# Patient Record
Sex: MALE | Race: WHITE | NOT HISPANIC OR LATINO | Employment: OTHER | URBAN - METROPOLITAN AREA
[De-identification: names, ages, dates, MRNs, and addresses within clinical notes are randomized per-mention and may not be internally consistent; named-entity substitution may affect disease eponyms.]

---

## 2020-10-06 ENCOUNTER — HOSPITAL ENCOUNTER (INPATIENT)
Facility: CLINIC | Age: 80
LOS: 3 days | Discharge: HOME | DRG: 389 | End: 2020-10-09
Attending: STUDENT IN AN ORGANIZED HEALTH CARE EDUCATION/TRAINING PROGRAM | Admitting: INTERNAL MEDICINE
Payer: COMMERCIAL

## 2020-10-06 ENCOUNTER — APPOINTMENT (OUTPATIENT)
Dept: GENERAL RADIOLOGY | Facility: CLINIC | Age: 80
DRG: 389 | End: 2020-10-06
Attending: STUDENT IN AN ORGANIZED HEALTH CARE EDUCATION/TRAINING PROGRAM
Payer: COMMERCIAL

## 2020-10-06 PROBLEM — K56.609 SMALL BOWEL OBSTRUCTION (H): Status: ACTIVE | Noted: 2020-10-06

## 2020-10-06 LAB
ALBUMIN SERPL-MCNC: 2.3 G/DL (ref 3.4–5)
ALP SERPL-CCNC: 69 U/L (ref 40–150)
ALT SERPL W P-5'-P-CCNC: 26 U/L (ref 0–70)
ANION GAP SERPL CALCULATED.3IONS-SCNC: 5 MMOL/L (ref 3–14)
AST SERPL W P-5'-P-CCNC: 11 U/L (ref 0–45)
BASOPHILS # BLD AUTO: 0 10E9/L (ref 0–0.2)
BASOPHILS NFR BLD AUTO: 0.5 %
BILIRUB SERPL-MCNC: 0.4 MG/DL (ref 0.2–1.3)
BUN SERPL-MCNC: 28 MG/DL (ref 7–30)
CALCIUM SERPL-MCNC: 8.9 MG/DL (ref 8.5–10.1)
CHLORIDE SERPL-SCNC: 112 MMOL/L (ref 94–109)
CO2 SERPL-SCNC: 22 MMOL/L (ref 20–32)
CREAT SERPL-MCNC: 1.26 MG/DL (ref 0.66–1.25)
CRP SERPL-MCNC: 110 MG/L (ref 0–8)
DIFFERENTIAL METHOD BLD: ABNORMAL
EOSINOPHIL # BLD AUTO: 0.4 10E9/L (ref 0–0.7)
EOSINOPHIL NFR BLD AUTO: 4.4 %
ERYTHROCYTE [DISTWIDTH] IN BLOOD BY AUTOMATED COUNT: 13 % (ref 10–15)
ERYTHROCYTE [SEDIMENTATION RATE] IN BLOOD BY WESTERGREN METHOD: 55 MM/H (ref 0–20)
GFR SERPL CREATININE-BSD FRML MDRD: 54 ML/MIN/{1.73_M2}
GLUCOSE SERPL-MCNC: 111 MG/DL (ref 70–99)
HCT VFR BLD AUTO: 35.1 % (ref 40–53)
HGB BLD-MCNC: 11.4 G/DL (ref 13.3–17.7)
IMM GRANULOCYTES # BLD: 0 10E9/L (ref 0–0.4)
IMM GRANULOCYTES NFR BLD: 0.4 %
LYMPHOCYTES # BLD AUTO: 1.3 10E9/L (ref 0.8–5.3)
LYMPHOCYTES NFR BLD AUTO: 15.6 %
MCH RBC QN AUTO: 30.2 PG (ref 26.5–33)
MCHC RBC AUTO-ENTMCNC: 32.5 G/DL (ref 31.5–36.5)
MCV RBC AUTO: 93 FL (ref 78–100)
MONOCYTES # BLD AUTO: 1.3 10E9/L (ref 0–1.3)
MONOCYTES NFR BLD AUTO: 15.6 %
NEUTROPHILS # BLD AUTO: 5.3 10E9/L (ref 1.6–8.3)
NEUTROPHILS NFR BLD AUTO: 63.5 %
NRBC # BLD AUTO: 0 10*3/UL
NRBC BLD AUTO-RTO: 0 /100
PLATELET # BLD AUTO: 200 10E9/L (ref 150–450)
POTASSIUM SERPL-SCNC: 3.6 MMOL/L (ref 3.4–5.3)
PROT SERPL-MCNC: 7 G/DL (ref 6.8–8.8)
RBC # BLD AUTO: 3.78 10E12/L (ref 4.4–5.9)
SODIUM SERPL-SCNC: 139 MMOL/L (ref 133–144)
WBC # BLD AUTO: 8.4 10E9/L (ref 4–11)

## 2020-10-06 PROCEDURE — 120N000002 HC R&B MED SURG/OB UMMC

## 2020-10-06 PROCEDURE — 87506 IADNA-DNA/RNA PROBE TQ 6-11: CPT | Performed by: STUDENT IN AN ORGANIZED HEALTH CARE EDUCATION/TRAINING PROGRAM

## 2020-10-06 PROCEDURE — 80053 COMPREHEN METABOLIC PANEL: CPT | Performed by: STUDENT IN AN ORGANIZED HEALTH CARE EDUCATION/TRAINING PROGRAM

## 2020-10-06 PROCEDURE — 87493 C DIFF AMPLIFIED PROBE: CPT | Performed by: STUDENT IN AN ORGANIZED HEALTH CARE EDUCATION/TRAINING PROGRAM

## 2020-10-06 PROCEDURE — 36415 COLL VENOUS BLD VENIPUNCTURE: CPT | Performed by: STUDENT IN AN ORGANIZED HEALTH CARE EDUCATION/TRAINING PROGRAM

## 2020-10-06 PROCEDURE — 85652 RBC SED RATE AUTOMATED: CPT | Performed by: STUDENT IN AN ORGANIZED HEALTH CARE EDUCATION/TRAINING PROGRAM

## 2020-10-06 PROCEDURE — 99222 1ST HOSP IP/OBS MODERATE 55: CPT | Mod: AI | Performed by: INTERNAL MEDICINE

## 2020-10-06 PROCEDURE — 83690 ASSAY OF LIPASE: CPT | Performed by: STUDENT IN AN ORGANIZED HEALTH CARE EDUCATION/TRAINING PROGRAM

## 2020-10-06 PROCEDURE — 999N000065 XR ABDOMEN PORT 1 VW

## 2020-10-06 PROCEDURE — 86140 C-REACTIVE PROTEIN: CPT | Performed by: STUDENT IN AN ORGANIZED HEALTH CARE EDUCATION/TRAINING PROGRAM

## 2020-10-06 PROCEDURE — 258N000003 HC RX IP 258 OP 636: Performed by: STUDENT IN AN ORGANIZED HEALTH CARE EDUCATION/TRAINING PROGRAM

## 2020-10-06 PROCEDURE — 999N000127 HC STATISTIC PERIPHERAL IV START W US GUIDANCE

## 2020-10-06 PROCEDURE — 85025 COMPLETE CBC W/AUTO DIFF WBC: CPT | Performed by: STUDENT IN AN ORGANIZED HEALTH CARE EDUCATION/TRAINING PROGRAM

## 2020-10-06 PROCEDURE — 74018 RADEX ABDOMEN 1 VIEW: CPT | Mod: 26

## 2020-10-06 RX ORDER — NALOXONE HYDROCHLORIDE 0.4 MG/ML
.1-.4 INJECTION, SOLUTION INTRAMUSCULAR; INTRAVENOUS; SUBCUTANEOUS
Status: DISCONTINUED | OUTPATIENT
Start: 2020-10-06 | End: 2020-10-09 | Stop reason: HOSPADM

## 2020-10-06 RX ORDER — CLOPIDOGREL BISULFATE 75 MG/1
75 TABLET ORAL DAILY
Status: DISCONTINUED | OUTPATIENT
Start: 2020-10-07 | End: 2020-10-09 | Stop reason: HOSPADM

## 2020-10-06 RX ORDER — LIDOCAINE 40 MG/G
CREAM TOPICAL
Status: DISCONTINUED | OUTPATIENT
Start: 2020-10-06 | End: 2020-10-09 | Stop reason: HOSPADM

## 2020-10-06 RX ORDER — DILTIAZEM HYDROCHLORIDE 180 MG/1
180 CAPSULE, COATED, EXTENDED RELEASE ORAL DAILY
Status: DISCONTINUED | OUTPATIENT
Start: 2020-10-07 | End: 2020-10-07

## 2020-10-06 RX ORDER — SODIUM CHLORIDE, SODIUM LACTATE, POTASSIUM CHLORIDE, CALCIUM CHLORIDE 600; 310; 30; 20 MG/100ML; MG/100ML; MG/100ML; MG/100ML
INJECTION, SOLUTION INTRAVENOUS CONTINUOUS
Status: DISCONTINUED | OUTPATIENT
Start: 2020-10-06 | End: 2020-10-07

## 2020-10-06 RX ORDER — ASPIRIN 81 MG/1
81 TABLET, CHEWABLE ORAL DAILY
Status: DISCONTINUED | OUTPATIENT
Start: 2020-10-07 | End: 2020-10-09 | Stop reason: HOSPADM

## 2020-10-06 RX ORDER — ACETAMINOPHEN 500 MG
1000 TABLET ORAL 4 TIMES DAILY
Status: DISCONTINUED | OUTPATIENT
Start: 2020-10-06 | End: 2020-10-09 | Stop reason: HOSPADM

## 2020-10-06 RX ORDER — HYDROMORPHONE HCL/0.9% NACL/PF 0.2MG/0.2
0.2 SYRINGE (ML) INTRAVENOUS ONCE
Status: DISCONTINUED | OUTPATIENT
Start: 2020-10-06 | End: 2020-10-06

## 2020-10-06 RX ADMIN — SODIUM CHLORIDE, POTASSIUM CHLORIDE, SODIUM LACTATE AND CALCIUM CHLORIDE: 600; 310; 30; 20 INJECTION, SOLUTION INTRAVENOUS at 23:53

## 2020-10-06 ASSESSMENT — MIFFLIN-ST. JEOR: SCORE: 1668.36

## 2020-10-07 ENCOUNTER — APPOINTMENT (OUTPATIENT)
Dept: GENERAL RADIOLOGY | Facility: CLINIC | Age: 80
DRG: 389 | End: 2020-10-07
Attending: STUDENT IN AN ORGANIZED HEALTH CARE EDUCATION/TRAINING PROGRAM
Payer: COMMERCIAL

## 2020-10-07 LAB
ANION GAP SERPL CALCULATED.3IONS-SCNC: 4 MMOL/L (ref 3–14)
BUN SERPL-MCNC: 17 MG/DL (ref 7–30)
C COLI+JEJUNI+LARI FUSA STL QL NAA+PROBE: NOT DETECTED
C DIFF TOX B STL QL: NEGATIVE
CALCIUM SERPL-MCNC: 8.9 MG/DL (ref 8.5–10.1)
CHLORIDE SERPL-SCNC: 113 MMOL/L (ref 94–109)
CO2 SERPL-SCNC: 25 MMOL/L (ref 20–32)
CREAT SERPL-MCNC: 1.29 MG/DL (ref 0.66–1.25)
EC STX1 GENE STL QL NAA+PROBE: NOT DETECTED
EC STX2 GENE STL QL NAA+PROBE: NOT DETECTED
ENTERIC PATHOGEN COMMENT: NORMAL
ERYTHROCYTE [DISTWIDTH] IN BLOOD BY AUTOMATED COUNT: 13.2 % (ref 10–15)
GFR SERPL CREATININE-BSD FRML MDRD: 52 ML/MIN/{1.73_M2}
GLUCOSE SERPL-MCNC: 100 MG/DL (ref 70–99)
HCT VFR BLD AUTO: 33.7 % (ref 40–53)
HGB BLD-MCNC: 10.6 G/DL (ref 13.3–17.7)
LIPASE SERPL-CCNC: 142 U/L (ref 73–393)
MCH RBC QN AUTO: 29.9 PG (ref 26.5–33)
MCHC RBC AUTO-ENTMCNC: 31.5 G/DL (ref 31.5–36.5)
MCV RBC AUTO: 95 FL (ref 78–100)
NOROV GI+II ORF1-ORF2 JNC STL QL NAA+PR: NOT DETECTED
PLATELET # BLD AUTO: 177 10E9/L (ref 150–450)
POTASSIUM SERPL-SCNC: 3.9 MMOL/L (ref 3.4–5.3)
RBC # BLD AUTO: 3.55 10E12/L (ref 4.4–5.9)
RVA NSP5 STL QL NAA+PROBE: NOT DETECTED
SALMONELLA SP RPOD STL QL NAA+PROBE: NOT DETECTED
SHIGELLA SP+EIEC IPAH STL QL NAA+PROBE: NOT DETECTED
SODIUM SERPL-SCNC: 143 MMOL/L (ref 133–144)
SPECIMEN SOURCE: NORMAL
V CHOL+PARA RFBL+TRKH+TNAA STL QL NAA+PR: NOT DETECTED
WBC # BLD AUTO: 7.2 10E9/L (ref 4–11)
Y ENTERO RECN STL QL NAA+PROBE: NOT DETECTED

## 2020-10-07 PROCEDURE — 74018 RADEX ABDOMEN 1 VIEW: CPT

## 2020-10-07 PROCEDURE — 36415 COLL VENOUS BLD VENIPUNCTURE: CPT | Performed by: STUDENT IN AN ORGANIZED HEALTH CARE EDUCATION/TRAINING PROGRAM

## 2020-10-07 PROCEDURE — 85027 COMPLETE CBC AUTOMATED: CPT | Performed by: STUDENT IN AN ORGANIZED HEALTH CARE EDUCATION/TRAINING PROGRAM

## 2020-10-07 PROCEDURE — 250N000013 HC RX MED GY IP 250 OP 250 PS 637: Performed by: INTERNAL MEDICINE

## 2020-10-07 PROCEDURE — 99233 SBSQ HOSP IP/OBS HIGH 50: CPT | Performed by: INTERNAL MEDICINE

## 2020-10-07 PROCEDURE — 250N000013 HC RX MED GY IP 250 OP 250 PS 637: Performed by: STUDENT IN AN ORGANIZED HEALTH CARE EDUCATION/TRAINING PROGRAM

## 2020-10-07 PROCEDURE — 80048 BASIC METABOLIC PNL TOTAL CA: CPT | Performed by: STUDENT IN AN ORGANIZED HEALTH CARE EDUCATION/TRAINING PROGRAM

## 2020-10-07 PROCEDURE — 999N000065 XR ABDOMEN PORT 1 VW

## 2020-10-07 PROCEDURE — 74018 RADEX ABDOMEN 1 VIEW: CPT | Mod: 26 | Performed by: RADIOLOGY

## 2020-10-07 PROCEDURE — 250N000009 HC RX 250: Performed by: INTERNAL MEDICINE

## 2020-10-07 PROCEDURE — 120N000002 HC R&B MED SURG/OB UMMC

## 2020-10-07 PROCEDURE — 258N000003 HC RX IP 258 OP 636: Performed by: STUDENT IN AN ORGANIZED HEALTH CARE EDUCATION/TRAINING PROGRAM

## 2020-10-07 RX ORDER — UBIDECARENONE 50 MG
1200 CAPSULE ORAL 2 TIMES DAILY WITH MEALS
COMMUNITY

## 2020-10-07 RX ORDER — ASPIRIN 81 MG/1
81 TABLET ORAL DAILY
COMMUNITY

## 2020-10-07 RX ORDER — DILTIAZEM HYDROCHLORIDE 240 MG/1
240 CAPSULE, COATED, EXTENDED RELEASE ORAL DAILY
Status: DISCONTINUED | OUTPATIENT
Start: 2020-10-07 | End: 2020-10-09 | Stop reason: HOSPADM

## 2020-10-07 RX ORDER — CHLORAL HYDRATE 500 MG
2 CAPSULE ORAL 2 TIMES DAILY WITH MEALS
COMMUNITY

## 2020-10-07 RX ORDER — DILTIAZEM HYDROCHLORIDE 240 MG/1
240 CAPSULE, EXTENDED RELEASE ORAL DAILY
COMMUNITY

## 2020-10-07 RX ORDER — MULTIVIT-MIN/IRON/FOLIC ACID/K 18-600-40
1000 CAPSULE ORAL DAILY
COMMUNITY

## 2020-10-07 RX ORDER — FUROSEMIDE 20 MG
40 TABLET ORAL DAILY
Status: DISCONTINUED | OUTPATIENT
Start: 2020-10-07 | End: 2020-10-09 | Stop reason: HOSPADM

## 2020-10-07 RX ORDER — CLOPIDOGREL BISULFATE 75 MG/1
75 TABLET ORAL DAILY
COMMUNITY

## 2020-10-07 RX ORDER — FUROSEMIDE 40 MG
40 TABLET ORAL DAILY
COMMUNITY

## 2020-10-07 RX ORDER — PERPHENAZINE 2 MG
1 TABLET ORAL DAILY
COMMUNITY

## 2020-10-07 RX ORDER — LOSARTAN POTASSIUM 25 MG/1
25 TABLET ORAL DAILY
COMMUNITY

## 2020-10-07 RX ORDER — LOSARTAN POTASSIUM 25 MG/1
25 TABLET ORAL DAILY
Status: DISCONTINUED | OUTPATIENT
Start: 2020-10-07 | End: 2020-10-09 | Stop reason: HOSPADM

## 2020-10-07 RX ADMIN — LOSARTAN POTASSIUM 25 MG: 25 TABLET, FILM COATED ORAL at 10:36

## 2020-10-07 RX ADMIN — DIATRIZOATE MEGLUMINE AND DIATRIZOATE SODIUM 100 ML: 660; 100 SOLUTION ORAL; RECTAL at 12:04

## 2020-10-07 RX ADMIN — SODIUM CHLORIDE, POTASSIUM CHLORIDE, SODIUM LACTATE AND CALCIUM CHLORIDE: 600; 310; 30; 20 INJECTION, SOLUTION INTRAVENOUS at 08:05

## 2020-10-07 RX ADMIN — TOPICAL ANESTHETIC 1 ML: 200 SPRAY DENTAL; PERIODONTAL at 12:29

## 2020-10-07 RX ADMIN — ACETAMINOPHEN 1000 MG: 500 TABLET, FILM COATED ORAL at 08:06

## 2020-10-07 RX ADMIN — ASPIRIN 81 MG: 81 TABLET, CHEWABLE ORAL at 08:08

## 2020-10-07 RX ADMIN — ACETAMINOPHEN 1000 MG: 500 TABLET, FILM COATED ORAL at 00:28

## 2020-10-07 RX ADMIN — DILTIAZEM HYDROCHLORIDE 240 MG: 240 CAPSULE, COATED, EXTENDED RELEASE ORAL at 10:37

## 2020-10-07 RX ADMIN — CLOPIDOGREL BISULFATE 75 MG: 75 TABLET ORAL at 08:09

## 2020-10-07 ASSESSMENT — ACTIVITIES OF DAILY LIVING (ADL)
ADLS_ACUITY_SCORE: 15
ADLS_ACUITY_SCORE: 17
ADLS_ACUITY_SCORE: 15

## 2020-10-07 NOTE — PROGRESS NOTES
"Colorectal Surgery Progress Note    Subjective:  Patient states he is doing \"fine\". States he has had numerous bowel obstructions in the past but this one did not seem to resolve on its own in a few days as others have. Patient no longer nausea, no vomiting. Passing gas and having several liquid BM. Patient would like to discuss future surgery to remove adhesions to prevent future bowel obstructions.    Vitals:  Vitals:    10/06/20 1950 10/06/20 2239   BP: (!) 188/73 (!) 159/69   BP Location: Right arm Left arm   Pulse: 83 72   Resp: 16 16   Temp: 95.7  F (35.4  C) 99  F (37.2  C)   TempSrc: Oral Oral   SpO2: 96% 94%   Weight: 215 lb 12.8 oz    Height: 5' 8\"      I/O:  I/O last 3 completed shifts:  In: 3 [I.V.:3]  Out: -     Physical Exam:  Gen: AAOx3, NAD  Heent: NG tube in place with 200ml light bilious drainage, no sclera icterus  Pulm: Non-labored breathing on RA  Abd: Soft, non-distended, appropriately tender, no guarding  Ext:  Warm and well-perfused    BMP  Recent Labs   Lab 10/06/20  2202      POTASSIUM 3.6   CHLORIDE 112*   CO2 22   BUN 28   CR 1.26*   *     CBC  Recent Labs   Lab 10/06/20  2202   WBC 8.4   HGB 11.4*   HCT 35.1*        CBC, BMP pending    ASSESSMENT: This is a 80 year old male with PMH of CAD s/p drug eluting stent placement two months ago on Plavix, PAD, and UC s/p total colectomy with J pouch and diverting loop ileostomy c/b leak and subsequent DLI takedown 7 months later (2000) who presents from an OSH w/ concerns for partial SBO. NG tube placed, patient NPO. Patient having active diarrhea and passing flatus.     -Continue conservative management with NPO status and decompression with NG tube  -Recommend Gastrograffin challenge (ordered)  -Recommended patient follow up outpatient with CRS to discuss risk and benefits of future surgeries to remove adhesions    Tamera Jimenez, MS4    Patient was seen and discussed with CRS fellow Dr. Donis.    Addendum:   Pt seen and " examined independent of medical student.   Agree with plan as above.   Verbally discussed with Medicine team.  Sherry Montiel PA-C  Colon and Rectal Surgery   [id number]

## 2020-10-07 NOTE — CONSULTS
[facility] Colorectal Surgery Consultation    Pratik Jimenez MRN# [id number]   Age: 80 year old YOB: 1940     Date of Admission:  10/6/2020    Date of Consult:   10/06/20    Reason for consult: SBO       Requesting service: Medicine; requesting provider: Dr. Noguera                   Assessment and Plan:   Assessment:   80 year old male with PMH of CAD s/p drug eluting stent placement two months ago on Plavix, PAD, and ulcerative colitis s/p total colectomy with J pouch and diverting loop ileostomy c/b leak and subsequent DLI takedown 7 months later (2000) with concerns for partial small bowel obstruction.         Plan:   - NG tube decompression  - NPO  - IV fluids  - Will consider possible small bowel follow through in AM  - Colorectal surgery to follow    Patient discussed with fellow, Dr. Donis.    Aleida Sherwood DO PGY-2  General Surgery Resident            Chief Complaint:     Abdominal pain and diarrhea         History of Present Illness:     80 year old male with PMH of CAD s/p drug eluting stent placement two months ago on Plavix, PAD, and ulcerative colitis s/p total colectomy with J pouch and diverting loop ileostomy c/b leak and subsequent DLI takedown 7 months later (2000) presents from OSH with abdominal pain. He states that this has happened to him approximately 12 times since having his DLI taken down. He has treated these episodes at home by remaining NPO and has never before required hospitalization for them. He ate a big meal of spaghetti and developed abdominal pain that was severe on Thursday, 10/1, and did not pass any gas or have any bowel movements until Saturday. These occurred following forcing himself to have an episode of emesis. Since that time he has continued to have RLQ abdominal pain and passing of brown liquid that does not appear to have any stool in it. He presented to the OSH on 10/4 to 10/6 where he had NG tube placed with low outputs. He had a Ct scan on  10/4 which demonstrated possible SBO on 10/4 with improvement on follow up CT scan on 10/6. However patient continues to have the watery stools and RLQ abdominal pain and feels that he has not yet opened up.          Past Medical History:   No past medical history on file.    CAD s/p PCI with drug eluting stent placement 2 months prior on plavix  PAD  UC         Past Surgical History:   No past surgical history on file.    Total colectomy with J pouch and DLI 2000  DLI takedown 2000          Social History:     Social History     Tobacco Use     Smoking status: Not on file   Substance Use Topics     Alcohol use: Not on file   Worked as          Family History:   No family history on file.     No family h/o bleeding/clotting disorders or problems with anesthesia.          Allergies:     Allergies   Allergen Reactions     Atorvastatin      Hmg-Coa-R Inhibitors      Lisinopril      Pt developed PONCHO from medication.              Medications:     Current Facility-Administered Medications   Medication     acetaminophen (TYLENOL) tablet 1,000 mg     aspirin (ASA) chewable tablet 81 mg     clopidogrel (PLAVIX) tablet 75 mg     diltiazem ER COATED BEADS (CARDIZEM CD/CARTIA XT) 24 hr capsule 180 mg     lactated ringers infusion     lidocaine (LMX4) cream     lidocaine 1 % 0.1-1 mL     melatonin tablet 1 mg     naloxone (NARCAN) injection 0.1-0.4 mg     sodium chloride (PF) 0.9% PF flush 3 mL     sodium chloride (PF) 0.9% PF flush 3 mL               Review of Systems:     See HPI above for pertinent findings.          Physical Exam:   All vitals have been reviewed  Temp:  [95.7  F (35.4  C)-99  F (37.2  C)] 99  F (37.2  C)  Pulse:  [72-83] 72  Resp:  [16] 16  BP: (159-188)/(69-73) 159/69  SpO2:  [94 %-96 %] 94 %  No intake or output data in the 24 hours ending 10/06/20 6033    Physical Exam:   Gen: Laying in bed, no acute distress, looks comfortable  Pulm: Non-labored breathing on room air, no tachypnea  CV:  RRR, no tachycardia, strong radial pulse  Abd: soft, moderately distended, tender to palpation over prior DLI site. NG tube in place but not to suction. Awaiting imaging for placement verification. No peritonitis. No palpable hernia at prior DLI site.   Extremities: warm, well perfused, no pedal edema           Data:   All laboratory data reviewed    Results:  BMP  Recent Labs   Lab 10/06/20  2202      POTASSIUM 3.6   CHLORIDE 112*   CO2 22   BUN 28   CR 1.26*   *     CBC  Recent Labs   Lab 10/06/20  2202   WBC 8.4   HGB 11.4*        LFT  Recent Labs   Lab 10/06/20  2202   AST 11   ALT 26   ALKPHOS 69   BILITOTAL 0.4   ALBUMIN 2.3*     Recent Labs   Lab 10/06/20  2202   *       Imaging:  CT from OSH available in PACS

## 2020-10-07 NOTE — PLAN OF CARE
Pt arrived from OSH to 7C at 1950. Alert and oriented x4.+bowel sounds. Passing flatus, diarrhea multiple times overnight. Stool sample sent, c-diff negative. NG to LIS, small amount of brown output. Denies nausea. PIV infusing maintenance IV fluids. NPO. Voiding spontaneously, not saving. Up with SBA, only needs assistance unplugging IV pole and disconnecting NG tube. /76, recheck 159/69. OVSS on room air.

## 2020-10-07 NOTE — PROGRESS NOTES
Admitted/transferred from: Outside hospital  2 RN full   skin assessment completed by Beverly Christianson, RN and Xi Zhao RN.  Skin assessment finding: skin intact, no problems. Blanchable redness on bilateral buttocks.   Interventions/actions: None    Will continue to monitor.

## 2020-10-07 NOTE — PHARMACY-ADMISSION MEDICATION HISTORY
Admission medication history interview status for the 10/6/2020 admission is complete. See Epic admission navigator for allergy information, pharmacy, prior to admission medications and immunization status.     Medication history interview sources:  Patient    Changes made to PTA medication list (reason)  Added all medications to pta med list (nothing in our Epic record prior)    Additional medication history information (including reliability of information, actions taken by pharmacist):  - Patient was a reliable historian. He was able to tell me his medications, dosages, and last time taken. He was also able to discuss his allergies and their reaction.   - No information was able to be obtained from [facility] or [facility] at this time.      Prior to Admission medications    Medication Sig Last Dose Taking? Auth Provider   aspirin 81 MG EC tablet Take 81 mg by mouth daily  Yes Unknown, Entered By History   clopidogrel (PLAVIX) 75 MG tablet Take 75 mg by mouth daily  Yes Unknown, Entered By History   diltiazem ER (DILT-XR) 240 MG 24 hr ER beaded capsule Take 240 mg by mouth daily  Yes Unknown, Entered By History   Ferrous Sulfate 28 MG TABS Take 28 mg by mouth daily  Yes Unknown, Entered By History   fish oil-omega-3 fatty acids 1000 MG capsule Take 2 g by mouth 2 times daily (with meals)  Yes Unknown, Entered By History   furosemide (LASIX) 40 MG tablet Take 40 mg by mouth daily  Yes Unknown, Entered By History   losartan (COZAAR) 25 MG tablet Take 25 mg by mouth daily  Yes Unknown, Entered By History   Misc Natural Products (GINSENG-COMPLEX PO) Take 1,000 mg by mouth daily  Yes Unknown, Entered By History   Multiple Vitamins-Minerals (LUTEIN-ZEAXANTHIN) TABS Take 1 tablet by mouth daily Patient reports each tablet contains: Lutein 40mg, Zeaxanthin 1600mcg  Yes Unknown, Entered By History   Multiple Vitamins-Minerals (SENIOR MULTIVITAMIN PLUS PO) Take 1 tablet by mouth daily  Yes Unknown, Entered By History    Red Yeast Rice 600 MG TABS Take 1,200 mg by mouth 2 times daily (with meals)  Yes Unknown, Entered By History   Vitamin D, Cholecalciferol, 25 MCG (1000 UT) TABS Take 1,000 Units by mouth daily  Yes Unknown, Entered By History         Medication history completed by: Sofiya Grant, PharmD

## 2020-10-07 NOTE — PROVIDER NOTIFICATION
Paged suellen gupta at [phone]. Waiting for xray results after advancement. Have not been able to connect NG to suction for pt. Paged on-call radiology with no response.    Page not returned. Paged suellen gupta at 0310. Waiting for xray results after advancement. Pt frustrated with plan of care. Have not been able to connect NG to suction for pt. Paged on-call radiology x2 with no response.    Spoke with Nicky Bone at 0325. MD viewed xray. Patient care order placed. Pt now connected to LIS.

## 2020-10-07 NOTE — PROGRESS NOTES
[facility]     Medicine Progress Note - Hospitalist Service, Gold 11       Date of Admission:  10/6/2020  Assessment & Plan        Pratik Jimenez is a 80 year old male with PMHx significant for ulcerative colitis s/p temporary jejunostomy and then final jejunal anal re-anastomosis; CAD s/p stent placement two months ago who presented to outside hospital with acute watery diarrhea and emesis. Labs significant for PONCHO (creatinine 3.8) and elevated CRP (4.8). Imaging showed multiple air-fluid levels consistent with SBO.  His primary team placed him on NG suction for 24 hours with minimal improvement in symptoms. Although serial abdominal xrays showed some improvement in the radiologic appearance of his ileus. At this point, primary team at outside [facility] came to the decision that patient required higher level of care.       # Partial small bowel obstruction  # Abdominal pain- resolved  CT at outside hospital showed multiple air-fluid levels consistent with SBO. NG suction for 24 hours gave minimal improvement in symptoms of diarrhea and abdominal pain.  c diff and enteric panel neg.  - NG pulled today after gastrograffin showed contrast all the way to the rectum  -- CRSurgery consulted and did gastrograffin study and recommended outpt consultation for potential surgical plan in future  -- Consider GI consult to look for UC flare if pain continues after resolution of partial SBO  -- Scheduled acetaminophen 1000mg QID  -- Hold PTA Loperamide     # Acute kidney injury - resolved to 1.29 on 10/7/2020   Creatinine at outside hospital 3.89 on initial presentation. Distant history of acute renal failure after being placed on Lisinopril in March 2019.  Since then, his baseline creatinine has been around 1.6-1.8. Given 2 liters of LR over 2 hours on initial presentation at [facility]; then placed on normal saline at 150 mL/hr. Improved over the admission to  2.38.  -- Daily BMPs  --  ml/hr discontinued with clear liq diet     # CAD s/p PCI and drug-eluting stent placement in August 2020  -- PTA ASA  -- PTA Clopidogrel     # Peripheral vascular disease  Was scheduled for RLE stent placement this week; patient spoke with his vascular surgeon who will postpone procedure for after he leaves the hospital. Noted that he does have diffuse vascular disease.     # HTN  - Hold furosemide until 10/8  - restarted home Losartan   - PTA dilt       Diet: Advance Diet as Tolerated: Clear Liquid Diet  Maintenance IV fluids stopped  DVT Prophylaxis: Ambulate every shift  Hernandez Catheter: not present  Code Status: Full Code           Disposition Plan   Expected discharge: Tomorrow, recommended to prior living arrangement once adequate pain management/ tolerating PO medications and able to tolerate po diet without return of SBO.  Entered: Skylar Avendano MD 10/07/2020, 6:01 PM       The patient's care was discussed with the Bedside Nurse, Patient, Patient's Family and CR surg Consultant.    Skylar Avendano MD  Hospitalist Service, [address]   Contact information available via [facility] Paging/Directory  Please see sign in/sign out for up to date coverage information  ______________________________________________________________________    Interval History   Rt abd pain has improved. NG is in place with suction, and he is comfortable with the CR surg plan for the day. remainder of 4 pt ros neg    Data reviewed today: I reviewed all medications, new labs and imaging results over the last 24 hours.     Physical Exam   Vital Signs: Temp: 97.3  F (36.3  C) Temp src: Oral BP: (!) 172/67 Pulse: 65   Resp: 18 SpO2: 94 % O2 Device: None (Room air)    Weight: 215 lbs 12.8 oz  Gen: NAD, alert, oriented, comfortably sitting in bed   HEENT: anicteric sclera ronny, noninjected sclera ronny.NJ in place  CV: RRR, no m/r/g. Systolic murmur,  unchanged   Resp: clear to ascultation bilaterally, no rales, wheezes or rhonchi.   Abdomen: Mild RUQ tenderness. mildly distended abdomen, + bowel sounds  Musculoskeletal: MAEE  Extremities: No cyanosis  Edema of BLE  Neurologic: Alert and oriented.Normal affect.     Data   Recent Labs   Lab 10/07/20  0732 10/06/20  2202   WBC 7.2 8.4   HGB 10.6* 11.4*   MCV 95 93    200    139   POTASSIUM 3.9 3.6   CHLORIDE 113* 112*   CO2 25 22   BUN 17 28   CR 1.29* 1.26*   ANIONGAP 4 5   DEVI 8.9 8.9   * 111*   ALBUMIN  --  2.3*   PROTTOTAL  --  7.0   BILITOTAL  --  0.4   ALKPHOS  --  69   ALT  --  26   AST  --  11   LIPASE  --  142     Recent Results (from the past 24 hour(s))   XR Abdomen Port 1 View    Narrative    XR ABDOMEN PORT 1 VW  10/6/2020 10:27 PM      HISTORY: NG tube placement    COMPARISON: None available    TECHNIQUE: Supine frontal view of the abdomen    FINDINGS: NG/OG tube with tip and side-port projecting over the  stomach fundus, although superimposed on itself. Surgical clips in the  right upper quadrant. No dilated loops of bowel, pneumatosis, portal  venous gas, or pneumoperitoneum on this supine radiograph. No  suspicious calcifications within the abdomen. Left basilar streaky  opacities. No suspicious osseous lesion.      Impression    IMPRESSION: NG/OG tube with tip and side-port projecting over the  stomach fundus and is coiled back on itself. You can try advancing 10  to 15 cm and see if it straightens itself out.    I have personally reviewed the examination and initial interpretation  and I agree with the findings.    YANETH COUCH MD   XR Abdomen Port 1 View    Narrative    EXAM: XR ABDOMEN PORT 1 VW  10/7/2020 12:39 AM     HISTORY:  NG tube placement.       COMPARISON:  Abdominal radiograph 10/6/2020.    FINDINGS: Supine radiograph of the abdomen. Interval advancement of  the NG tube with tip and side-port overlying the stomach.  Cholecystectomy clips. Nonspecific,  nonobstructive bowel gas pattern.  No pneumatosis or portal venous gas. Streaky left basilar opacities  likely representing atelectasis. No acute osseous abnormality.      Impression    IMPRESSION: Nasogastric tube with tip and side-port overlying the  stomach.    I have personally reviewed the examination and initial interpretation  and I agree with the findings.    SAIGE VALENCIA MD   XR Abdomen 1 View    Narrative    Examination:  XR ABDOMEN 1 VW 10/7/2020 4:21 PM     Comparison: X-ray 10/7/2020    History: SBO; gastrografin challenge. want KUB 4 hours after contrast  given via NG tube    Findings: Supine radiograph of the abdomen.  Multiple surgical clips  in the right upper abdomen. Gastric tube tip and sidehole projected  over the stomach. Surgical suture is seen in the right abdomen and  pelvis. Prominent air-filled loops of bowel are seen over the right  abdomen. No pneumatosis or portal venous gas. Oral contrast is seen  within the patient's J-pouch.       Impression    Impression: Oral contrast is present within the patient's J-pouch.    I have personally reviewed the examination and initial interpretation  and I agree with the findings.    YULISA JOHNSON, DO     Medications       acetaminophen  1,000 mg Oral 4x Daily     aspirin  81 mg Oral Daily     clopidogrel  75 mg Oral Daily     diltiazem ER COATED BEADS  240 mg Oral Daily     [Held by provider] furosemide  40 mg Oral Daily     losartan  25 mg Oral Daily     sodium chloride (PF)  3 mL Intracatheter Q8H

## 2020-10-07 NOTE — PROVIDER NOTIFICATION
Paged suellen crosscover at [phone]. Pt is having abdominal pain. No pain medications ordered. Pt's BP also elevated upon arrival 188/73. NG orders also need clarification regarding suction orders.     MD aware. Placed order for scheduled Tylenol and one time dose 0.2 mg IV Dilaudid. Per CJ RAMIREZ to LIS.

## 2020-10-07 NOTE — H&P
[facility]  Internal Medicine  History and Physical    Date of service: 10/6/2020   Patient: Pratik Jimenez      : 1940      MRN: [id number]  PCP: No primary care provider on file.            Assessment/Plan:   Pratik Jimenez is a 80 year old male with PMHx significant for ulcerative colitis s/p temporary jejunostomy and then final jejunal anal re-anastomosis; CAD s/p stent placement two months ago who presented to outside hospital with acute watery diarrhea and emesis. Labs significant for PONCHO (creatinine 3.8) and elevated CRP (4.8). Imaging showed multiple air-fluid levels consistent with SBO.  His primary team placed him on NG suction for 24 hours with minimal improvement in symptoms. Although serial abdominal xrays showed some improvement in the radiologic appearance of his ileus. At this point, primary team at outside [facility] came to the decision that patient required higher level of care.       # Partial small bowel obstruction  # Abdominal pain  CT at outside hospital showed multiple air-fluid levels consistent with SBO. NG suction for 24 hours gave minimal improvement in symptoms of diarrhea and abdominal pain. Differential includes C. Diff colitis vs. UC flare vs. Partial SBO from adhesions or ileus.  -- Abdominal XR > put NG tube on LIS if in good position on abdominal XR (if <250cc over 12 hours, then keep in)  -- C. Diff panel  -- Enteric panel  -- Surgery consulted  -- Consider GI consult to look for UC flare if needed  -- Scheduled Acetaminophen 1000mg QID  -- Hold PTA Loperamide    # Acute kidney injury   Creatinine at outside hospital 3.89 on initial presentation. Distant history of acute renal failure after being placed on Lisinopril in 2019.  Since then, his baseline creatinine has been around 1.6-1.8. Given 2 liters of LR over 2 hours on initial presentation at [facility]; then placed on normal saline at 150 mL/hr. Improved over the admission to  "2.38.  -- Daily BMPs  --  ml/hr    # CAD s/p PCI and drug-eluting stent placement in August 2020  -- PTA ASA  -- PTA Clopidogrel    # Peripheral vascular disease  Was scheduled for RLE stent placement this week; patient spoke with his vascular surgeon who will postpone procedure for after he leaves the hospital. Noted that he does have diffuse vascular disease.    # HTN  - Hold Furosemide and Losartan due to PONCHO and dehydration  - PTA Amlodipine      F/E/N:  - IV Fluids:  ml/hr  - Electrolytes: Replete electrolytes as needed per protocol  - Diet: NPO for possible procedure    VTE ppx: Ambulate every shift  Code Status: FULL CODE  Disposition: Admit to Med/Surg    Plan of care discussed with attending physician Dr. Patel.    Jayesh Noguera MD  [facility] Service  PGY-1 Internal Medicine  [facility]            Chief complaint:   Abdominal pain          History of Present Illness:   Pratik Jimenez is a 80 year old male with PMHx significant for ulcerative colitis s/p total colectomy, temporary jejunostomy and then final jejunal anal re-anastomosis; CAD s/p stent placement two months ago who presented to outside hospital with acute watery diarrhea and emesis.    Reports that since he had his total colectomy in 2000, he has had 12 episodes where he eats a large meal and has an extreme amount of abdominal pain (more than 10/10) occur in his RLQ. This usually lasts about 24 hours and then resolves on its own. He had an episode like this on Thursday, October 1, 2020 which began three to four hours after eating a large plate of spaghetti. He reports his pain was greater than 10/10 and described as \"sharp\". It continued past the normal 24 hour robinson for him and he eventually took an old hydrocodone pill which \"took the edge off\". Reports he tried inducing vomiting (which helped resolve a similar episode in the past) which resulted in minimal green emesis and no improvement of his symptoms. " Saturday morning he was beginning to feel general weakness. On Jef morning, he decided to go to [facility] in [place], [place]. Patient states he has also had diarrhea which does not have fecal matter but just appears green; he reports he has always had diarrhea but that it normally has fecal matter present but it does not presently.    Denies acute worsening of his chronic SOB, chest pain, palpitations, nausea, vomiting (other than the one time he induced vomiting), focal weakness, or focal numbness.      During his [facility] hospitalization:  - Initial UA notable for 3+ blood; otherwise unremarkable.  - Initial CMP notable for sodium 132 (L), bicarbonate 16 (L), anion gap 23 (H), BUN 59 (H), Creatinine 3.89 (H), Glucose 201 (H), Total protein 8.9 (H), ALT 44 (H); otherwise normal.  - Initial CBC unremarkable.  - 2 view abdominal xray on 10/4 showed concern for SBO with multiple dilated air-fluid levels that are all central or in the right abdomen with a paucity of bowel gas in the left aspect of the abdomen  - CT abdomen/pelvis without contrast showed air-fluid levels throughout mid to distal small bowel loops to the rectum consistent with a type of ileus or enteritis.  - He was given 2 liters of LR over 2 hours due to c/f prerenal etiology of his PONCHO.  - Case was discussed with Dr. Ramirez, general surgery at [facility] in [place], who felt dehydration was patient's main issue and that 24 to 48 hours of NG decompression was a reasonable initial strategy with a plan for oral contrast challenge if no improvement.  - Followup 2 view abdominal xray on 10/5 showed persistent ileus possibly related to adhesion at R-sided small bowel operative site.  - Followup 2 view abdominal xray on 10/6 showed solving ileus with NG tube removed  - Prior to transfer, labs notable for CRP 11.9 (H), BUN 31 (H), Creatinine 1.63 (H), Hgb 11.5 (L), and hematocrit 34.5 (L).            Review of Systems:    10-point ROS reviewed & found negative w/ exceptions noted in the HPI.         Allergies:   - Lisinopril (caused renal failure per patient report)  - Atorvastatin and Rosuvastatin (patient states he got ulcerative colitis right after starting Atorvastatin)          Medications:   - Clopidogrel 75mg daily  - Aspirin 81mg daily  - Diltiazem 24 hour ER capsule 180mg daily  - Losartan 25mg daily  - Furosemide 40mg daily  - Loperamide 2mg BID prn  - Ferrous sulfate 28mg daily  - Multivitamin daily  - Cholecalciferol 1000 units daily  - Red yeast rice 600mg QID  - Omega-3 Fatty Acids 1000mg QID            Past Medical History:   - Ulcerative colitis  - CAD  - Hypertension  - Peripheral vascular disease - scheduled for R lower extremity revascularization in 2020  - Shortness of breath and dyspnea on exertion for 7-8 years which he states has been evaluated with cardiac and pulmonary workups without any etiology discovered          Past Surgical History:   : Cholecystectomy  : Total colectomy; temporary jejunostomy and subsequent jejunal anal re-anastomosis  2020: PCI with stent placement for 70% stenosis of RCA         Family History:   - Father:  at age 99  - Mother: had leukemia;  from colon cancer at age 84  - Sister 1: Age 88 and healthy currently  - Sister 2:  at age 88 a couple years ago from unknown cause          Social History:   Diet: Regular diet at home  Exercise: plays golf; cuts wood with chainsaw; work around the house and yard  Tobacco: Quit smoking 32 years ago. Smoked 2 packs per day for 25 years  Alcohol: Quit drinking alcohol 38 years ago  Recreational substances: negative  Caffeine: 3 cups of coffee per day  Occupation: Worked as a  until  when he retired  Home: Currently lives with wife in motor home which they previously traveled the country in            Physical Exam:   BP (!) 188/73 (BP Location: Right arm)   Pulse 83   Temp 95.7  F (35.4  C)  "(Oral)   Resp 16   Ht 1.727 m (5' 8\")   Wt 97.9 kg (215 lb 12.8 oz)   SpO2 96%   BMI 32.81 kg/m    Temp (24hrs), Av.7  F (35.4  C), Min:95.7  F (35.4  C), Max:95.7  F (35.4  C)    Wt Readings from Last 2 Encounters:   10/06/20 97.9 kg (215 lb 12.8 oz)     General Appearance: Well developed, well nourished, in no acute distress.   Skin: No rashes, ulcerations, or petechiae.  HEENT: PERRLA, EOMI intact, anicteric sclera, clear conjunctiva.  Neck: Supple and symmetric, no thyromegaly, no tenderness, no masses.  Cardiovascular: RRR, no rubs, or gallops. Systolic murmur heard most prominently over the left 2nd intercostal space (per patient has been noted for multiple years). Normal carotid pulses. Weak peripheral pulses in all four extremities.  Lungs: clear to ascultation bilaterally, no rales, wheezes or rhonchi.   Abdomen: Mild RLQ tenderness. Non-distended abdomen, normal bowel sounds. No inguinal or umbilical hernias, no ascites.  Musculoskeletal: Normal gait, no tenderness or effusions noted. Muscle strength and tone were normal.  Extremities: No cyanosis, clubbing or edema.  Neurologic: Alert and oriented x 3. Normal affect.           Data:   No results found for this or any previous visit (from the past 24 hour(s)).     "

## 2020-10-07 NOTE — PLAN OF CARE
"BP (!) 177/71 (BP Location: Right arm)   Pulse 61   Temp 97.7  F (36.5  C) (Oral)   Resp 18   Ht 1.727 m (5' 8\")   Wt 97.9 kg (215 lb 12.8 oz)   SpO2 96%   BMI 32.81 kg/m      0730 - 1530  Reason for admission: Small bowel obstruction.  Neuro: A&Ox4.   Cardiac: SR. VSS.   Respiratory: Sating 96% on RA.  GI/: Adequate urine output. Several watery stools.  Diet/appetite: NPO.  Activity: Independent up to chair and in halls.  Pain: At acceptable level on current regimen.   Skin: No new deficits noted.  LDA's:PIV infusing LR @ 100 ml/hr. NG putout 200 ml of liquid brown.  New this shift: Gastrografin given per pt care order at noon. The NG clamped for two hours, then back to LIS at 1400. The Xray will be at 1600, writer spoke to xray staff and pt is set for 1600. The evening RN will follow up.   Plan: Continue with POC. Notify primary team with changes.  "

## 2020-10-08 LAB
ANION GAP SERPL CALCULATED.3IONS-SCNC: 9 MMOL/L (ref 3–14)
BUN SERPL-MCNC: 15 MG/DL (ref 7–30)
CALCIUM SERPL-MCNC: 9.5 MG/DL (ref 8.5–10.1)
CHLORIDE SERPL-SCNC: 111 MMOL/L (ref 94–109)
CO2 SERPL-SCNC: 22 MMOL/L (ref 20–32)
CREAT SERPL-MCNC: 1.28 MG/DL (ref 0.66–1.25)
ERYTHROCYTE [DISTWIDTH] IN BLOOD BY AUTOMATED COUNT: 13.1 % (ref 10–15)
GFR SERPL CREATININE-BSD FRML MDRD: 53 ML/MIN/{1.73_M2}
GLUCOSE SERPL-MCNC: 124 MG/DL (ref 70–99)
HCT VFR BLD AUTO: 35 % (ref 40–53)
HGB BLD-MCNC: 11.3 G/DL (ref 13.3–17.7)
MAGNESIUM SERPL-MCNC: 2 MG/DL (ref 1.6–2.3)
MCH RBC QN AUTO: 30.2 PG (ref 26.5–33)
MCHC RBC AUTO-ENTMCNC: 32.3 G/DL (ref 31.5–36.5)
MCV RBC AUTO: 94 FL (ref 78–100)
PLATELET # BLD AUTO: 216 10E9/L (ref 150–450)
POTASSIUM SERPL-SCNC: 3.2 MMOL/L (ref 3.4–5.3)
POTASSIUM SERPL-SCNC: 4.1 MMOL/L (ref 3.4–5.3)
RBC # BLD AUTO: 3.74 10E12/L (ref 4.4–5.9)
SODIUM SERPL-SCNC: 141 MMOL/L (ref 133–144)
WBC # BLD AUTO: 8.3 10E9/L (ref 4–11)

## 2020-10-08 PROCEDURE — 84132 ASSAY OF SERUM POTASSIUM: CPT | Performed by: INTERNAL MEDICINE

## 2020-10-08 PROCEDURE — 85027 COMPLETE CBC AUTOMATED: CPT | Performed by: INTERNAL MEDICINE

## 2020-10-08 PROCEDURE — 250N000013 HC RX MED GY IP 250 OP 250 PS 637: Performed by: STUDENT IN AN ORGANIZED HEALTH CARE EDUCATION/TRAINING PROGRAM

## 2020-10-08 PROCEDURE — 250N000013 HC RX MED GY IP 250 OP 250 PS 637: Performed by: INTERNAL MEDICINE

## 2020-10-08 PROCEDURE — 99232 SBSQ HOSP IP/OBS MODERATE 35: CPT | Performed by: INTERNAL MEDICINE

## 2020-10-08 PROCEDURE — 36415 COLL VENOUS BLD VENIPUNCTURE: CPT | Performed by: INTERNAL MEDICINE

## 2020-10-08 PROCEDURE — 120N000002 HC R&B MED SURG/OB UMMC

## 2020-10-08 PROCEDURE — 83735 ASSAY OF MAGNESIUM: CPT | Performed by: INTERNAL MEDICINE

## 2020-10-08 PROCEDURE — 80048 BASIC METABOLIC PNL TOTAL CA: CPT | Performed by: INTERNAL MEDICINE

## 2020-10-08 RX ORDER — POTASSIUM CHLORIDE 29.8 MG/ML
20 INJECTION INTRAVENOUS
Status: DISCONTINUED | OUTPATIENT
Start: 2020-10-08 | End: 2020-10-09 | Stop reason: HOSPADM

## 2020-10-08 RX ORDER — POTASSIUM CHLORIDE 750 MG/1
20-40 TABLET, EXTENDED RELEASE ORAL
Status: DISCONTINUED | OUTPATIENT
Start: 2020-10-08 | End: 2020-10-09 | Stop reason: HOSPADM

## 2020-10-08 RX ORDER — POTASSIUM CL/LIDO/0.9 % NACL 10MEQ/0.1L
10 INTRAVENOUS SOLUTION, PIGGYBACK (ML) INTRAVENOUS
Status: DISCONTINUED | OUTPATIENT
Start: 2020-10-08 | End: 2020-10-09 | Stop reason: HOSPADM

## 2020-10-08 RX ORDER — POTASSIUM CHLORIDE 1.5 G/1.58G
20-40 POWDER, FOR SOLUTION ORAL
Status: DISCONTINUED | OUTPATIENT
Start: 2020-10-08 | End: 2020-10-09 | Stop reason: HOSPADM

## 2020-10-08 RX ORDER — POTASSIUM CHLORIDE 7.45 MG/ML
10 INJECTION INTRAVENOUS
Status: DISCONTINUED | OUTPATIENT
Start: 2020-10-08 | End: 2020-10-09 | Stop reason: HOSPADM

## 2020-10-08 RX ADMIN — POTASSIUM CHLORIDE 40 MEQ: 750 TABLET, EXTENDED RELEASE ORAL at 10:15

## 2020-10-08 RX ADMIN — CLOPIDOGREL BISULFATE 75 MG: 75 TABLET ORAL at 08:06

## 2020-10-08 RX ADMIN — BENZOCAINE, MENTHOL 1 LOZENGE: 15; 3.6 LOZENGE ORAL at 21:54

## 2020-10-08 RX ADMIN — POTASSIUM CHLORIDE 20 MEQ: 750 TABLET, EXTENDED RELEASE ORAL at 12:22

## 2020-10-08 RX ADMIN — BENZOCAINE, MENTHOL 1 LOZENGE: 15; 3.6 LOZENGE ORAL at 05:00

## 2020-10-08 RX ADMIN — DILTIAZEM HYDROCHLORIDE 240 MG: 240 CAPSULE, COATED, EXTENDED RELEASE ORAL at 08:06

## 2020-10-08 RX ADMIN — FUROSEMIDE 40 MG: 20 TABLET ORAL at 08:41

## 2020-10-08 RX ADMIN — ASPIRIN 81 MG: 81 TABLET, CHEWABLE ORAL at 08:06

## 2020-10-08 RX ADMIN — LOSARTAN POTASSIUM 25 MG: 25 TABLET, FILM COATED ORAL at 08:07

## 2020-10-08 ASSESSMENT — ACTIVITIES OF DAILY LIVING (ADL)
ADLS_ACUITY_SCORE: 15

## 2020-10-08 NOTE — PLAN OF CARE
Status: Patient admitted for SBO.   VS: VSS on RA.   Neuros: A&Ox4.   GI/: Tolerating clear liquid diet. Denies nausea. BM x1. Voiding spontaneously.   IV: L PIV SL.   Activity: Up independently.   Pain: Sore throat controlled w/ PRN Cepacol.   Respiratory/Trach: No issues.   Skin: Intact.   Plan of Care: Plan to discharge home today. Expressed concern w/ transportation to home. SW consult place. Continue to monitor and follow POC.

## 2020-10-08 NOTE — PROGRESS NOTES
"Colorectal Surgery Progress Note     Subjective:  No acute events overnight. Afebrile, hypertensive but hemodynamically stable on room air. Passing gas. Passed gastrografin challenge, NG tube removed, tolerating clear liquid. Patient states he is overall doing well and continues to be interested in discussing possible surgery to prevent bowel obstructions in the future.    Vitals:  BP (!) 152/57 (BP Location: Right arm)   Pulse 60   Temp 97.3  F (36.3  C) (Oral)   Resp 16   Ht 5' 8\"   Wt 215 lb 12.8 oz   SpO2 95%   BMI 32.81 kg/m       I/O:  Intake/Output Summary (Last 24 hours) at 10/8/2020 0835  Last data filed at 10/7/2020 2319  Gross per 24 hour   Intake 601 ml   Output 2920 ml   Net -2319 ml     Physical Exam:  Gen:      AAOx3, NAD  Pulm:    Non-labored breathing on RA  Abd:      Soft, non-distended, appropriately tender, no guarding  Ext:       Warm and well-perfused    Labs:  CBC RESULTS:   Recent Labs   Lab Test 10/08/20  0715   WBC 8.3   RBC 3.74*   HGB 11.3*   HCT 35.0*   MCV 94   MCH 30.2   MCHC 32.3   RDW 13.1        BMP as of 10/8/2020 08:37    10/8/2020 07:15   Sodium  141   Potassium  3.2 (L)   Chloride  111 (H)   Carbon Dioxide  22   Urea Nitrogen  15   Creatinine  1.28 (H)   GFR Estimate  53 (L)   GFR Estimate If Black  61   Calcium  9.5   Anion Gap  9     ASSESSMENT: This is a 80 year old male with PMH of CAD s/p drug eluting stent placement two months ago on Plavix, PAD, and UC s/p total colectomy with J pouch and diverting loop ileostomy c/b leak and subsequent DLI takedown 7 months later (2000) who presents from an OSH w/ concerns for partial SBO. Passed gastrografin challenge, NG tube removed, tolerating clear liquid diet.   - advance to low residue diet  - anticipate discharge later today, okay to discharge from a CRS standpoint   - outpatient follow up with CRS in 3-4 weeks with Dr. Soto to further discuss surgical intervention, will place follow up order, we have " contacted her clinic      Patient's care discussed with colorectal fellow, Dr. Donis.    Rosy Jett, MS4    Laisha Ching MD  General Surgery, PGY-1

## 2020-10-08 NOTE — PLAN OF CARE
Time: 3128-2476    Reason for Admission: SBO    Activity: Independent    Neuro: A&O x4. Calm and cooperative.    GI/: Voiding spontaneously. Multiple loose BM's this shift.     Diet: Clear Liquids, tolerating well.     Incisions/Drains: None    IV Access: L PIV saline locked.     Vitals: Hypertensive. AOVSS on RA.    Pain: Pt denied any pain or nausea this shift.     New changes this shift: NG removed this shift. MIVF discontinued.     Plan: Discharge tomorrow. Continue with plan of care.

## 2020-10-08 NOTE — PROGRESS NOTES
[facility]     Medicine Progress Note - Hospitalist Service, [facility] [place]       Date of Admission:  10/6/2020  Assessment & Plan        Pratik Jimenez is a 80 year old male with PMHx significant for ulcerative colitis s/p temporary jejunostomy and then final jejunal anal re-anastomosis; CAD s/p stent placement two months ago who presented to outside hospital with acute watery diarrhea and emesis. Labs significant for PONCHO (creatinine 3.8) and elevated CRP (4.8). Imaging showed multiple air-fluid levels consistent with SBO.  His primary team placed him on NG suction for 24 hours with minimal improvement in symptoms. Although serial abdominal xrays showed some improvement in the radiologic appearance of his ileus. At this point, primary team at outside [facility] came to the decision that patient required higher level of care.       # Partial small bowel obstruction  # Abdominal pain- resolved  CT at outside hospital showed multiple air-fluid levels consistent with SBO. NG suction for 24 hours gave minimal improvement in symptoms of diarrhea and abdominal pain.  c diff and enteric panel neg. NG pulled after gastrograffin showed contrast all the way to the rectum  -- CRSurgery consultation appreciated. outpt consultation for potential surgical plan in future in 3-4 wks  -- Scheduled acetaminophen 1000mg QID  -- PTA Loperamide can be resumed prn at home     # Acute kidney injury - resolved to 1.28 on 10/8/2020   Creatinine at outside hospital 3.89 on initial presentation. Distant history of acute renal failure after being placed on Lisinopril in March 2019.  Since then, his baseline creatinine has been around 1.6-1.8. Given 2 liters of LR over 2 hours on initial presentation at [facility]; then placed on normal saline at 150 mL/hr. Improved over the admission to 2.38.  -- Daily BMPs  -furosemide resumed on 10/8     # CAD s/p PCI and drug-eluting stent placement in  August 2020  -- PTA ASA  -- PTA Clopidogrel     # Peripheral vascular disease  Was scheduled for RLE stent placement this week; patient spoke with his vascular surgeon who will postpone procedure for after he leaves the hospital. Noted that he does have diffuse vascular disease.     # HTN  - home furosemide resumed  - cont home Losartan   - PTA dilt cont       Diet: Diet  Advance Diet as Tolerated: Regular Diet Adult; Low Fiber    DVT Prophylaxis: Ambulate every shift  Hernandez Catheter: not present  Code Status: Full Code           Disposition Plan   Expected discharge: Tomorrow, recommended to prior living arrangement once adequate pain management/ tolerating PO medications and able to tolerate po diet without return of SBO.  Entered: Skylar Avendano MD 10/08/2020, 1:47 PM       The patient's care was discussed with the Bedside Nurse, Patient, Patient's Family and CR surg Consultant.    Skylar Avendano MD  Hospitalist Service, [address]   Contact information available via [facility] Paging/Directory  Please see sign in/sign out for up to date coverage information  ______________________________________________________________________    Interval History   Rt abd pain has improved since removal of NG and after a solid breakfast. He would like to urinate and eat lunch to be sure that he won't develop another SBO with resumption of his diet. His way home is by renting a car and driving 3 hrs, and the car rental agency is only open until 5pm, which doesn't leave enough time for him to make sure his abd pain doesn't resume after his late lunch today. remainder of 4 pt ros neg    Data reviewed today: I reviewed all medications, new labs and imaging results over the last 24 hours.     Physical Exam   Vital Signs: Temp: 97.3  F (36.3  C) Temp src: Oral BP: (!) 152/57 Pulse: 60   Resp: 16 SpO2: 95 % O2 Device: None (Room air)    Weight: 215 lbs 12.8 oz  Gen: NAD,  alert, oriented, comfortably sitting in bed   HEENT: anicteric sclera ronny, noninjected sclera ronny.  CV: RRR, no m/r/g. Systolic murmur, unchanged   Resp: clear to ascultation bilaterally, no rales, wheezes or rhonchi.   Abdomen: no RUQ tenderness. nondistended abdomen, + bowel sounds  Musculoskeletal: MAEE  Extremities: No cyanosis  Edema of BLE  Neurologic: Alert and oriented.Normal affect.     Data   Recent Labs   Lab 10/08/20  0715 10/07/20  0732 10/06/20  2202   WBC 8.3 7.2 8.4   HGB 11.3* 10.6* 11.4*   MCV 94 95 93    177 200    143 139   POTASSIUM 3.2* 3.9 3.6   CHLORIDE 111* 113* 112*   CO2 22 25 22   BUN 15 17 28   CR 1.28* 1.29* 1.26*   ANIONGAP 9 4 5   DEVI 9.5 8.9 8.9   * 100* 111*   ALBUMIN  --   --  2.3*   PROTTOTAL  --   --  7.0   BILITOTAL  --   --  0.4   ALKPHOS  --   --  69   ALT  --   --  26   AST  --   --  11   LIPASE  --   --  142     Recent Results (from the past 24 hour(s))   XR Abdomen 1 View    Narrative    Examination:  XR ABDOMEN 1 VW 10/7/2020 4:21 PM     Comparison: X-ray 10/7/2020    History: SBO; gastrografin challenge. want KUB 4 hours after contrast  given via NG tube    Findings: Supine radiograph of the abdomen.  Multiple surgical clips  in the right upper abdomen. Gastric tube tip and sidehole projected  over the stomach. Surgical suture is seen in the right abdomen and  pelvis. Prominent air-filled loops of bowel are seen over the right  abdomen. No pneumatosis or portal venous gas. Oral contrast is seen  within the patient's J-pouch.       Impression    Impression: Oral contrast is present within the patient's J-pouch.    I have personally reviewed the examination and initial interpretation  and I agree with the findings.    YULISA JOHNSON, DO     Medications       acetaminophen  1,000 mg Oral 4x Daily     aspirin  81 mg Oral Daily     clopidogrel  75 mg Oral Daily     diltiazem ER COATED BEADS  240 mg Oral Daily     furosemide  40 mg Oral Daily     losartan   25 mg Oral Daily     sodium chloride (PF)  3 mL Intracatheter Q8H

## 2020-10-08 NOTE — CONSULTS
Care Management Discharge Note    Discharge Planning:  Expected Discharge Date: 10/08/20    Concerns to be Addressed:  Transportation Home  Anticipated Discharge Disposition:  Home to self Care  Anticipated Discharge Services:  N/A  Anticipated Discharge DME:  N/A  Education Provided on the Discharge Plan:  Yes  Patient/Family in Agreement with the Plan: Yes    Disposition Comments:    SW consult to assist pt with transportation home.  Pt does not have family that can transport him home and does not have transportation benefits through his insurance.  SW discussed potential transportation options (Taxi, [employer], [employer]), pt would like to rent a car.  SW assisted pt in locating a rental car agency.  Ultimately, his plan is to take a taxi ([employer]) to [facility] [employer] [place].  He will then take the rental car to [facility] where his spouse will pick him up.  [facility] in [place] is only open today from 7:30am-5pm, pt is aware.  Pt has all of the addresses and phone numbers written down and will call once his discharge time is confirmed.  IMM given.    TR Pittman, [facility]  Phone:  [phone]   Pager:  [phone]

## 2020-10-08 NOTE — PLAN OF CARE
"BP (!) 140/71 (BP Location: Right arm)   Pulse 55   Temp 95.1  F (35.1  C) (Oral)   Resp 16   Ht 1.727 m (5' 8\")   Wt 97.9 kg (215 lb 12.8 oz)   SpO2 96%   BMI 32.81 kg/m      0730 - 2330    Neuro: A&Ox4.   Cardiac: SR. VSS.   Respiratory: Sating 96% on RA.  GI/: Adequate urine output. BM X 4  Diet/appetite: Tolerating regular diet. Eating well.  Activity:  Independent up to chair and in halls.  Pain: At acceptable level on current regimen.   Skin: No new deficits noted.  LDA's:PIV saline locked.  New this shift : Pt's urine output improved after increased fluid intake. Potassium was 3.2 and pt received total of 60 Meq of KCL and the recheck came back at 4.1. he is tolerating the regular diet.   Plan: Continue with POC. Notify primary team with changes.  "

## 2020-10-08 NOTE — PROVIDER NOTIFICATION
Pt only voided 40 ml post Lasix and bladder scan of 30 ml. Writer paged and spoke to Skylar Avendano MD. Will encourage fluid intake and monitor output for now.

## 2020-10-09 VITALS
HEART RATE: 64 BPM | TEMPERATURE: 97.7 F | RESPIRATION RATE: 16 BRPM | BODY MASS INDEX: 32.71 KG/M2 | DIASTOLIC BLOOD PRESSURE: 64 MMHG | HEIGHT: 68 IN | SYSTOLIC BLOOD PRESSURE: 160 MMHG | WEIGHT: 215.8 LBS | OXYGEN SATURATION: 96 %

## 2020-10-09 LAB
ANION GAP SERPL CALCULATED.3IONS-SCNC: 5 MMOL/L (ref 3–14)
BUN SERPL-MCNC: 16 MG/DL (ref 7–30)
CALCIUM SERPL-MCNC: 9.3 MG/DL (ref 8.5–10.1)
CHLORIDE SERPL-SCNC: 110 MMOL/L (ref 94–109)
CO2 SERPL-SCNC: 26 MMOL/L (ref 20–32)
CREAT SERPL-MCNC: 1.34 MG/DL (ref 0.66–1.25)
ERYTHROCYTE [DISTWIDTH] IN BLOOD BY AUTOMATED COUNT: 13 % (ref 10–15)
GFR SERPL CREATININE-BSD FRML MDRD: 50 ML/MIN/{1.73_M2}
GLUCOSE SERPL-MCNC: 108 MG/DL (ref 70–99)
HCT VFR BLD AUTO: 38.8 % (ref 40–53)
HGB BLD-MCNC: 12.3 G/DL (ref 13.3–17.7)
MCH RBC QN AUTO: 30.2 PG (ref 26.5–33)
MCHC RBC AUTO-ENTMCNC: 31.7 G/DL (ref 31.5–36.5)
MCV RBC AUTO: 95 FL (ref 78–100)
PLATELET # BLD AUTO: 260 10E9/L (ref 150–450)
POTASSIUM SERPL-SCNC: 3.9 MMOL/L (ref 3.4–5.3)
RBC # BLD AUTO: 4.07 10E12/L (ref 4.4–5.9)
SODIUM SERPL-SCNC: 140 MMOL/L (ref 133–144)
WBC # BLD AUTO: 11.3 10E9/L (ref 4–11)

## 2020-10-09 PROCEDURE — 250N000013 HC RX MED GY IP 250 OP 250 PS 637: Performed by: INTERNAL MEDICINE

## 2020-10-09 PROCEDURE — 80048 BASIC METABOLIC PNL TOTAL CA: CPT | Performed by: INTERNAL MEDICINE

## 2020-10-09 PROCEDURE — 250N000013 HC RX MED GY IP 250 OP 250 PS 637: Performed by: STUDENT IN AN ORGANIZED HEALTH CARE EDUCATION/TRAINING PROGRAM

## 2020-10-09 PROCEDURE — 36415 COLL VENOUS BLD VENIPUNCTURE: CPT | Performed by: INTERNAL MEDICINE

## 2020-10-09 PROCEDURE — 99238 HOSP IP/OBS DSCHRG MGMT 30/<: CPT | Performed by: INTERNAL MEDICINE

## 2020-10-09 PROCEDURE — 85027 COMPLETE CBC AUTOMATED: CPT | Performed by: INTERNAL MEDICINE

## 2020-10-09 RX ADMIN — CLOPIDOGREL BISULFATE 75 MG: 75 TABLET ORAL at 07:53

## 2020-10-09 RX ADMIN — ASPIRIN 81 MG: 81 TABLET, CHEWABLE ORAL at 07:53

## 2020-10-09 RX ADMIN — LOSARTAN POTASSIUM 25 MG: 25 TABLET, FILM COATED ORAL at 07:52

## 2020-10-09 RX ADMIN — DILTIAZEM HYDROCHLORIDE 240 MG: 240 CAPSULE, COATED, EXTENDED RELEASE ORAL at 07:53

## 2020-10-09 RX ADMIN — FUROSEMIDE 40 MG: 20 TABLET ORAL at 07:52

## 2020-10-09 ASSESSMENT — ACTIVITIES OF DAILY LIVING (ADL)
ADLS_ACUITY_SCORE: 15

## 2020-10-09 NOTE — PLAN OF CARE
Hypertensive with BP of 179/67, notified [facility] regarding BP. No new orders obtained. Otherwise AVSS. Denies pain and nausea. Declined scheduled tylenol. Slept most of the night. On a regular diet. Passing flatus and had a BM on evenings. Voiding adequate amounts. Up ab brando. Plan for discharge home today. Will take a cab to rental car facility and then drive himself home to [place].

## 2020-10-10 NOTE — DISCHARGE SUMMARY
[facility]   Hospitalist Discharge Summary      Date of Admission:  10/6/2020  Date of Discharge:  10/9/2020  9:40 AM  Discharging Provider: Skylar Avendano MD  Discharge Team: Hospitalist Service, Gold 11    Discharge Diagnoses   Partial small bowel obstruction with abdominal pain  Acute renal injury  Hx of CAD s/p PCI and EFE  PVD  HTN    Follow-ups Needed After Discharge   Follow-up Appointments     Follow Up ([facility])      FOLLOW-UP  1.  You will need to follow-up with  Rectal Surgery clinic in 3-4 week(s)   with CRS Staff: Dr. Soto to discuss possibility for surgery vs   other management.  Please contact our clinic scheduler (phone #   [phone]) if you have not heard from our clinic in 3 business days   afer discharge to schedule a follow-up appointment.     2.  Follow up with your primary care provider in 1-2 weeks after discharge   from the hospital to review this hospitalization.       Appointments on [place] and/or [facility] (with [facility] or [facility]   provider or service). Call [phone] if you haven't heard regarding   these appointments within 7 days of discharge.         Follow Up and recommended labs and tests      Follow-up with colorectal surgery in clinic to discuss the possibility of   a surgical fix to the problem of your recurrent small bowel obstructions:   outpatient follow up with CRS in 3-4 weeks with Dr. Soto to   further discuss surgical intervention, will place follow up order, we have   contacted her clinic             Discharge Disposition   Discharged to home  Condition at discharge: Good    Hospital Course          Pratik Jimenez is a 80 year old male with PMHx significant for ulcerative colitis s/p temporary jejunostomy and then final jejunal anal re-anastomosis; CAD s/p stent placement two months ago who presented to outside hospital with acute watery diarrhea and emesis. Labs significant for PONCHO  (creatinine 3.8) and elevated CRP (4.8). Imaging showed multiple air-fluid levels consistent with SBO.  His primary team placed him on NG suction for 24 hours with minimal improvement in symptoms. Although serial abdominal xrays showed some improvement in the radiologic appearance of his ileus. At this point, primary team at outside [facility] hospital came to the decision that patient required higher level of care.       # Partial small bowel obstruction  # Abdominal pain- resolved  CT at outside hospital showed multiple air-fluid levels consistent with SBO. NG suction for 24 hours gave minimal improvement in symptoms of diarrhea and abdominal pain.  C diff and enteric panel were neg. NG was pulled after gastrograffin showed contrast all the way to the rectum.  CRSurgery was consulted, and they recommended outpt consultation for potential surgical plan in future in 3-4 wks. Loperamide can be resumed prn at home.     # Acute kidney injury - resolved to 1.28 on 10/8/2020   Creatinine at outside hospital was 3.89 on initial presentation. Romulo had a distant history of acute renal failure after being placed on Lisinopril in March 2019.  Since then, his baseline creatinine has been 1.6-1.8. He was given 2 liters of LR over 2 hours on initial presentation at [facility]; then placed on normal saline at 150 mL/hr. His creatinine Improved over the admission at [place] to 2.38. His PONCHO resolved rapidly on admission to [facility], with a creatinine of 1.34 on 10/9. His furosemide was resumed on 10/8.     # CAD s/p PCI and drug-eluting stent placement in August 2020  ASA and clopidogrel were continued.     # Peripheral vascular disease  Was scheduled for RLE stent placement this week; patient spoke with his vascular surgeon who will postpone procedure for after he leaves the hospital. Noted that he does have diffuse vascular disease.     # HTN  His home furosemide was initially held along with his losartan due to his PONCHO, but when  this resolved, he resumed them both. Home diltiazem ws continued on admission.       Consultations This Hospital Stay   SURGERY GENERAL ADULT IP CONSULT  PHARMACY IP CONSULT  VASCULAR ACCESS CARE ADULT IP CONSULT  SOCIAL WORK IP CONSULT      Time Spent on this Encounter   I, Skylar Avendano MD, personally saw the patient today and spent less than or equal to 30 minutes discharging this patient.       Skylra Avendano MD  [facility] [address] [address]  Phone: [phone]  ______________________________________________________________________    Physical Exam   Vital Signs: Temp: 97.7  F (36.5  C) Temp src: Oral BP: (!) 160/64 Pulse: 64   Resp: 16 SpO2: 96 % O2 Device: None (Room air)    Weight: 215 lbs 12.8 oz   Gen: NAD, alert, oriented, comfortably sitting in bed   HEENT: anicteric sclera ronny, noninjected sclera ronny.  CV: RRR, no m/r/g. Systolic murmur, unchanged   Resp: clear to ascultation bilaterally, no rales, wheezes or rhonchi.   Abdomen: no RUQ tenderness. nondistended abdomen, + bowel sounds  Musculoskeletal: MAEE  Extremities: No cyanosis  Edema of BLE  Neurologic: Alert and oriented.Normal affect.        Primary Care Physician   Yissel Melton    Discharge Orders      Reason for your hospital stay    You were transferred to [facility] with a partial small bowel obstruction to speak with colorectal surgery.     Activity    Your activity upon discharge: activity as tolerated     Follow Up ([facility])    FOLLOW-UP  1.  You will need to follow-up with  Rectal Surgery clinic in 3-4 week(s) with CRS Staff: Dr. Soto to discuss possibility for surgery vs other management.  Please contact our clinic scheduler (phone # [phone]) if you have not heard from our clinic in 3 business days afer discharge to schedule a follow-up appointment.     2.  Follow up with your primary care provider in 1-2 weeks after discharge from the hospital to review this  hospitalization.       Appointments on [place] and/or [facility] (with [facility] or [facility] provider or service). Call [phone] if you haven't heard regarding these appointments within 7 days of discharge.     Follow Up and recommended labs and tests    Follow-up with colorectal surgery in clinic to discuss the possibility of a surgical fix to the problem of your recurrent small bowel obstructions: outpatient follow up with CRS in 3-4 weeks with Dr. Soto to further discuss surgical intervention, will place follow up order, we have contacted her clinic     Diet    Follow this diet upon discharge:regular diet       Significant Results and Procedures   Most Recent 3 CBC's:  Recent Labs   Lab Test 10/09/20  0727 10/08/20  0715 10/07/20  0732   WBC 11.3* 8.3 7.2   HGB 12.3* 11.3* 10.6*   MCV 95 94 95    216 177     Most Recent 3 BMP's:  Recent Labs   Lab Test 10/09/20  0727 10/08/20  1603 10/08/20  0715 10/07/20  0732     --  141 143   POTASSIUM 3.9 4.1 3.2* 3.9   CHLORIDE 110*  --  111* 113*   CO2 26  --  22 25   BUN 16  --  15 17   CR 1.34*  --  1.28* 1.29*   ANIONGAP 5  --  9 4   DEVI 9.3  --  9.5 8.9   *  --  124* 100*     Most Recent 2 LFT's:  Recent Labs   Lab Test 10/06/20  2202   AST 11   ALT 26   ALKPHOS 69   BILITOTAL 0.4   ,   Results for orders placed or performed during the hospital encounter of 10/06/20   XR Abdomen Port 1 View    Narrative    XR ABDOMEN PORT 1 VW  10/6/2020 10:27 PM      HISTORY: NG tube placement    COMPARISON: None available    TECHNIQUE: Supine frontal view of the abdomen    FINDINGS: NG/OG tube with tip and side-port projecting over the  stomach fundus, although superimposed on itself. Surgical clips in the  right upper quadrant. No dilated loops of bowel, pneumatosis, portal  venous gas, or pneumoperitoneum on this supine radiograph. No  suspicious calcifications within the abdomen. Left basilar streaky  opacities. No suspicious osseous lesion.       Impression    IMPRESSION: NG/OG tube with tip and side-port projecting over the  stomach fundus and is coiled back on itself. You can try advancing 10  to 15 cm and see if it straightens itself out.    I have personally reviewed the examination and initial interpretation  and I agree with the findings.    YANETH COUCH MD   XR Abdomen Port 1 View    Narrative    EXAM: XR ABDOMEN PORT 1 VW  10/7/2020 12:39 AM     HISTORY:  NG tube placement.       COMPARISON:  Abdominal radiograph 10/6/2020.    FINDINGS: Supine radiograph of the abdomen. Interval advancement of  the NG tube with tip and side-port overlying the stomach.  Cholecystectomy clips. Nonspecific, nonobstructive bowel gas pattern.  No pneumatosis or portal venous gas. Streaky left basilar opacities  likely representing atelectasis. No acute osseous abnormality.      Impression    IMPRESSION: Nasogastric tube with tip and side-port overlying the  stomach.    I have personally reviewed the examination and initial interpretation  and I agree with the findings.    SAIGE VALENCIA MD   XR Abdomen 1 View    Narrative    Examination:  XR ABDOMEN 1 VW 10/7/2020 4:21 PM     Comparison: X-ray 10/7/2020    History: SBO; gastrografin challenge. want KUB 4 hours after contrast  given via NG tube    Findings: Supine radiograph of the abdomen.  Multiple surgical clips  in the right upper abdomen. Gastric tube tip and sidehole projected  over the stomach. Surgical suture is seen in the right abdomen and  pelvis. Prominent air-filled loops of bowel are seen over the right  abdomen. No pneumatosis or portal venous gas. Oral contrast is seen  within the patient's J-pouch.       Impression    Impression: Oral contrast is present within the patient's J-pouch.    I have personally reviewed the examination and initial interpretation  and I agree with the findings.    YULISA JOHNSON, DO       Discharge Medications   Discharge Medication List as of 10/9/2020  9:06 AM       CONTINUE these medications which have NOT CHANGED    Details   aspirin 81 MG EC tablet Take 81 mg by mouth daily, Historical      clopidogrel (PLAVIX) 75 MG tablet Take 75 mg by mouth daily, Historical      diltiazem ER (DILT-XR) 240 MG 24 hr ER beaded capsule Take 240 mg by mouth daily, Historical      Ferrous Sulfate 28 MG TABS Take 28 mg by mouth daily, Historical      fish oil-omega-3 fatty acids 1000 MG capsule Take 2 g by mouth 2 times daily (with meals), Historical      furosemide (LASIX) 40 MG tablet Take 40 mg by mouth daily, Historical      losartan (COZAAR) 25 MG tablet Take 25 mg by mouth daily, Historical      Misc Natural Products (GINSENG-COMPLEX PO) Take 1,000 mg by mouth daily, Historical      !! Multiple Vitamins-Minerals (LUTEIN-ZEAXANTHIN) TABS Take 1 tablet by mouth daily Patient reports each tablet contains: Lutein 40mg, Zeaxanthin 1600mcg, Historical      !! Multiple Vitamins-Minerals (SENIOR MULTIVITAMIN PLUS PO) Take 1 tablet by mouth daily, Historical      Red Yeast Rice 600 MG TABS Take 1,200 mg by mouth 2 times daily (with meals), Historical      Vitamin D, Cholecalciferol, 25 MCG (1000 UT) TABS Take 1,000 Units by mouth daily, Historical       !! - Potential duplicate medications found. Please discuss with provider.        Allergies   Allergies   Allergen Reactions     Atorvastatin      Hmg-Coa-R Inhibitors      Lisinopril      Pt developed PONCHO from medication.

## 2020-10-11 ENCOUNTER — PATIENT OUTREACH (OUTPATIENT)
Dept: CARE COORDINATION | Facility: CLINIC | Age: 80
End: 2020-10-11

## 2020-10-12 ENCOUNTER — PATIENT OUTREACH (OUTPATIENT)
Dept: SURGERY | Facility: CLINIC | Age: 80
End: 2020-10-12

## 2020-10-12 NOTE — PROGRESS NOTES
[facility]: Post-Discharge Note  SITUATION                                                      Admission:    Admission Date: 10/06/20   Reason for Admission: Partial small bowel obstruction with abdominal pain  Discharge:   Discharge Date: 10/09/20  Discharge Diagnosis: Partial small bowel obstruction with abdominal pain    BACKGROUND                                                      Pratik Jimenez is a 80 year old male with PMHx significant for ulcerative colitis s/p temporary jejunostomy and then final jejunal anal re-anastomosis; CAD s/p stent placement two months ago who presented to outside hospital with acute watery diarrhea and emesis. Labs significant for PONCHO (creatinine 3.8) and elevated CRP (4.8). Imaging showed multiple air-fluid levels consistent with SBO.  His primary team placed him on NG suction for 24 hours with minimal improvement in symptoms. Although serial abdominal xrays showed some improvement in the radiologic appearance of his ileus. At this point, primary team at outside [facility] came to the decision that patient required higher level of care.     ASSESSMENT      Discharge Assessment  Patient reports symptoms are: Improved  Does the patient have all of their medications?: Yes  Does patient know what their new medications are for?: Yes  Does patient have a follow-up appointment scheduled?: Yes  Does patient have any other questions or concerns?: No    Post-op  Did the patient have surgery or a procedure: No  Fever: No  Chills: No  Eating & Drinking: eating and drinking without complaints/concerns  PO Intake: regular diet  Bowel Function: normal  Urinary Status: voiding without complaint/concerns        PLAN                                                      Outpatient Plan:     Follow-up Appointments     Follow Up ([facility])      FOLLOW-UP  1.  You will need to follow-up with  Rectal Surgery clinic in 3-4 week(s)   with CRS Staff: Dr. Soto to  discuss possibility for surgery vs   other management.  Please contact our clinic scheduler (phone #   [phone]) if you have not heard from our clinic in 3 business days   afer discharge to schedule a follow-up appointment.      2.  Follow up with your primary care provider in 1-2 weeks after discharge   from the hospital to review this hospitalization.         Appointments on [place] and/or [place] campus (with [facility] or [facility]   provider or service). Call [phone] if you haven't heard regarding   these appointments within 7 days of discharge.         Follow Up and recommended labs and tests      Follow-up with colorectal surgery in clinic to discuss the possibility of   a surgical fix to the problem of your recurrent small bowel obstructions:   outpatient follow up with CRS in 3-4 weeks with Dr. Soto to   further discuss surgical intervention, will place follow up order, we have   contacted her clinic     No future appointments.        Ellen Galvez, CMA

## 2020-10-12 NOTE — PROGRESS NOTES
Called to update pt that our schedulers will contact him to find a date to discuss surgery for SBO. Talked to his emergency contact catrachita. She stated pt will call me back

## 2020-11-24 NOTE — PROGRESS NOTES
"Colon and Rectal Surgery Consult Telephone Note    Date: 2020     Referring provider:  Referred Self, MD  No address on file     RE: Pratik Jimenez  : 1940  EMIL: 2020    Pratik Jimenez is a 80 year old male who is being evaluated via a billable telephone visit.      The patient has been notified of following:     \"This telephone visit will be conducted via a call between you and your physician/provider. We have found that certain health care needs can be provided without the need for a physical exam.  This service lets us provide the care you need with a short phone conversation.  If a prescription is necessary we can send it directly to your pharmacy.  If lab work is needed we can place an order for that and you can then stop by our lab to have the test done at a later time.    If during the course of the call the physician/provider feels a telephone visit is not appropriate, you will not be charged for this service.\"     Patient has given verbal consent for telephone visit? Yes    Phone call duration: 5:20PM minutes     Pratik Jimenez is a very pleasant 80 year old male here for follow up of small bowel obstruction.    HISTORY OF PRESENT ILNESS: Colorectal history significant for ulcerative colitis and his is status post total proctocolectomy with J pouch complicated by leak and subsequent diverting loop ileostomy takedown 7 months later (). More recently, his coronary artery disease required stent placement 2020. He presented to outside hospital with acute watery diarrhea and emesis. Labs significant for PONCHO (creatinine 3.8) and elevated CRP (4.8). CT at outside hospital showed multiple air-fluid levels consistent with small bowel obstruction. He was transferred to [facility] and this was managed conservatively with resolution.     CT 10/4/2020: Patient has a small bowel pull-through with a rectal anastomosis. There are air-fluid levels throughout more " mid to distal small bowel loops to the rectum. A type of ileus or enteritis should be considered.    Interval History: In 2000, he had the ileal pouch anal anastomosis by Dr. Wexner at [facility].  He states that he has had intermittent small bowel obstructions approximately every year to every year and a half.  Most of the time, these episodes last approximately a day in duration.  They tend to be associated with certain eating.  He has had episodes from spaghetti including this most recent time.  One time it happened after eating a bag of peanuts.  This time however he was hospitalized for a longer duration.  He states that it did resolve after having a nasogastric tube.  No one performed in a rectal exam.  He denies any secondary stigmata from his ulcerative colitis.  He has never had a flexible pouchoscopy and does not follow-up with gastroenterology or a colorectal surgeon for this.      Assessment/Plan: ileal pouch anal anastomosis for ulcerative colitis.  Recent small bowel obstruction possibly from food bolus obstruction.  It appears on his CT scan that the obstruction is likely quite distal.  Possibly, the obstruction is at the ileal pouch anal anastomosis.  Also without having a previous flexible pouchoscopy or serial biopsies with surveillance for dysplasia it is unclear if he potentially has stenosis at the ileal pouch anal anastomosis most distally.  I recommended that the patient be seen in clinic for a digital rectal examination to understand the anatomy.  I also recommended that he undergo flexible pouchoscopy and that I would be happy to do this.  I recommended that we schedule this and offered to arrange this for him.  The patient requested that he call us if he would like to have this done.  He wishes to consider this further and may not wish to have this done.  We spoke at length about sticking with a low residue diet, chewing food well, and considering diet selection because he may  have a relative stenosis.  Also noted comorbidities of coronary artery disease with recent stent placement.    Total time was 20 minutes, over 50% was spent counseling the patient.           Medical history:  Past Medical History:   Diagnosis Date     Ulcerative colitis (H)        Surgical history:  No past surgical history on file.    Problem list:    Patient Active Problem List    Diagnosis Date Noted     Coronary artery disease involving native heart, angina presence unspecified, unspecified vessel or lesion type 11/26/2020     Priority: Medium     Small bowel obstruction (H) 10/06/2020     Priority: Medium     Bradycardia 03/13/2020     Priority: Medium     Last Assessment & Plan:   Cause unclear. This was noted during recent admission.  Patient has completed Holter although interpretation not available yet.    *12 lead ECG obtained which did not show bradycardic rate, unchanged from 02/20/2020 study.  *Await Holter results       GONZALEZ (dyspnea on exertion) 03/13/2020     Priority: Medium     Other specified hypothyroidism 03/05/2020     Priority: Medium     Last Assessment & Plan:   Deferred conversation as patient overwhelmed, will discuss at future OV       Diarrhea 02/06/2019     Priority: Medium     Male erectile dysfunction 09/20/2017     Priority: Medium     Chronic sinusitis 07/19/2016     Priority: Medium     Ulcerative colitis with complication (H) 01/12/2016     Priority: Medium     Last Assessment & Plan:   Frequent stooling seems to be from frequent sensation of needing to defecate from bloating/gas.  For now, patient would like to continue cholestyramine as this does seem to make stools a little more formed and rx was expensive.  Will also start simethicone to see if this helps with bloating.       Essential (primary) hypertension 01/12/2016     Priority: Medium     Last Assessment & Plan:   BP at goal of <140/90 mmHg without significant adverse effects  *Continue current regimen  *Will continue to  monitor BP, renal function, and potential medication side effects at future OV       Hyperlipidemia 01/12/2016     Priority: Medium     ACC/AHA 10 year ASCVD risk score 45%, moderate intensity statin recommended based on age and 02/2020 labs      Last Assessment & Plan:   Patient concerned about taking atorvastatin or any other statin because he feels this caused his IBD.  Recommended he discuss concerns and possible alternative agents such as PCSK9 inhibitors with Cardiology given upcoming consultation for anginal chest pain.  *Pt verbalized understanding and agreement with this plan         Medications:  Current Outpatient Medications   Medication Sig Dispense Refill     aspirin 81 MG EC tablet Take 81 mg by mouth daily       clopidogrel (PLAVIX) 75 MG tablet Take 75 mg by mouth daily       diltiazem ER (DILT-XR) 240 MG 24 hr ER beaded capsule Take 240 mg by mouth daily       Ferrous Sulfate 28 MG TABS Take 28 mg by mouth daily       fish oil-omega-3 fatty acids 1000 MG capsule Take 2 g by mouth 2 times daily (with meals)       furosemide (LASIX) 40 MG tablet Take 40 mg by mouth daily       losartan (COZAAR) 25 MG tablet Take 25 mg by mouth daily       Misc Natural Products (GINSENG-COMPLEX PO) Take 1,000 mg by mouth daily       Multiple Vitamins-Minerals (LUTEIN-ZEAXANTHIN) TABS Take 1 tablet by mouth daily Patient reports each tablet contains: Lutein 40mg, Zeaxanthin 1600mcg       Multiple Vitamins-Minerals (SENIOR MULTIVITAMIN PLUS PO) Take 1 tablet by mouth daily       Red Yeast Rice 600 MG TABS Take 1,200 mg by mouth 2 times daily (with meals)       Vitamin D, Cholecalciferol, 25 MCG (1000 UT) TABS Take 1,000 Units by mouth daily         Allergies:  Allergies   Allergen Reactions     Lisinopril Other (See Comments) and Unknown     Pt developed PONCHO from medication.   Renal failure  Renal failure        Rosuvastatin      Other reaction(s): Abdominal pain     Hmg-Coa-R Inhibitors      Atorvastatin Diarrhea  "      Family history:  No family history on file.    Social history:  Social History     Tobacco Use     Smoking status: Former Smoker     Quit date:      Years since quittin.9     Smokeless tobacco: Never Used   Substance Use Topics     Alcohol use: Not on file    Marital status: .      Nursing Notes:   Chirag Segal EMT  2020  4:54 PM  Signed  Chief Complaint   Patient presents with     Consult     Discuss surgery for recurrent SBO.       Vitals:    20 1653   Weight: 215 lb   Height: 5' 8\"       Body mass index is 32.69 kg/m .      DARRIUS Resendiz MD  Colon and Rectal Surgery Attending  Department of Surgery  [facility]      "

## 2020-11-25 ENCOUNTER — VIRTUAL VISIT (OUTPATIENT)
Dept: SURGERY | Facility: CLINIC | Age: 80
End: 2020-11-25
Payer: COMMERCIAL

## 2020-11-25 VITALS — HEIGHT: 68 IN | WEIGHT: 215 LBS | BODY MASS INDEX: 32.58 KG/M2

## 2020-11-25 DIAGNOSIS — K56.609 SMALL BOWEL OBSTRUCTION (H): Primary | ICD-10-CM

## 2020-11-25 PROCEDURE — 99442 PR PHYSICIAN TELEPHONE EVALUATION 11-20 MIN: CPT | Performed by: COLON & RECTAL SURGERY

## 2020-11-25 ASSESSMENT — PAIN SCALES - GENERAL: PAINLEVEL: NO PAIN (0)

## 2020-11-25 ASSESSMENT — MIFFLIN-ST. JEOR: SCORE: 1664.73

## 2020-11-25 NOTE — LETTER
"     RE: Pratik Jimenez  [id number] [facility] [id number]     Dear Colleague,    Thank you for referring your patient, Pratik Jimenez, to the [facility] [place] at [facility]. Please see a copy of my visit note below.    Colon and Rectal Surgery Consult Telephone Note    Date: 2020     Referring provider:  Referred Self, MD  No address on file     RE: Pratik Jimenez  : 1940  EMIL: 2020    Pratik Jimenez is a 80 year old male who is being evaluated via a billable telephone visit.      The patient has been notified of following:     \"This telephone visit will be conducted via a call between you and your physician/provider. We have found that certain health care needs can be provided without the need for a physical exam.  This service lets us provide the care you need with a short phone conversation.  If a prescription is necessary we can send it directly to your pharmacy.  If lab work is needed we can place an order for that and you can then stop by our lab to have the test done at a later time.    If during the course of the call the physician/provider feels a telephone visit is not appropriate, you will not be charged for this service.\"     Patient has given verbal consent for telephone visit? Yes    Phone call duration: 5:20PM minutes         Pratik Jimenez is a very pleasant 80 year old male here for follow up of small bowel obstruction.    HISTORY OF PRESENT ILNESS: Colorectal history significant for ulcerative colitis and his is status post total proctocolectomy with J pouch complicated by leak and subsequent diverting loop ileostomy takedown 7 months later (). More recently, his coronary artery disease required stent placement 2020. He presented to outside hospital with acute watery diarrhea and emesis. Labs significant for PONCHO (creatinine 3.8) and elevated CRP (4.8). CT at outside hospital " showed multiple air-fluid levels consistent with small bowel obstruction. He was transferred to [facility] and this was managed conservatively with resolution.     CT 10/4/2020: Patient has a small bowel pull-through with a rectal anastomosis. There are air-fluid levels throughout more mid to distal small bowel loops to the rectum. A type of ileus or enteritis should be considered.    Interval History: In 2000, he had the ileal pouch anal anastomosis by Dr. Wexner at [facility].  He states that he has had intermittent small bowel obstructions approximately every year to every year and a half.  Most of the time, these episodes last approximately a day in duration.  They tend to be associated with certain eating.  He has had episodes from spaghetti including this most recent time.  One time it happened after eating a bag of peanuts.  This time however he was hospitalized for a longer duration.  He states that it did resolve after having a nasogastric tube.  No one performed in a rectal exam.  He denies any secondary stigmata from his ulcerative colitis.  He has never had a flexible pouchoscopy and does not follow-up with gastroenterology or a colorectal surgeon for this.    Assessment/Plan: ileal pouch anal anastomosis for ulcerative colitis.  Recent small bowel obstruction possibly from food bolus obstruction.  It appears on his CT scan that the obstruction is likely quite distal.  Possibly, the obstruction is at the ileal pouch anal anastomosis.  Also without having a previous flexible pouchoscopy or serial biopsies with surveillance for dysplasia it is unclear if he potentially has stenosis at the ileal pouch anal anastomosis most distally.  I recommended that the patient be seen in clinic for a digital rectal examination to understand the anatomy.  I also recommended that he undergo flexible pouchoscopy and that I would be happy to do this.  I recommended that we schedule  this and offered to arrange this for him.  The patient requested that he call us if he would like to have this done.  He wishes to consider this further and may not wish to have this done.  We spoke at length about sticking with a low residue diet, chewing food well, and considering diet selection because he may have a relative stenosis.  Also noted comorbidities of coronary artery disease with recent stent placement.    Total time was 20 minutes, over 50% was spent counseling the patient.       Medical history:  Past Medical History:   Diagnosis Date     Ulcerative colitis (H)        Surgical history:  No past surgical history on file.    Problem list:    Patient Active Problem List    Diagnosis Date Noted     Coronary artery disease involving native heart, angina presence unspecified, unspecified vessel or lesion type 11/26/2020     Priority: Medium     Small bowel obstruction (H) 10/06/2020     Priority: Medium     Bradycardia 03/13/2020     Priority: Medium     Last Assessment & Plan:   Cause unclear. This was noted during recent admission.  Patient has completed Holter although interpretation not available yet.    *12 lead ECG obtained which did not show bradycardic rate, unchanged from 02/20/2020 study.  *Await Holter results       GONZALEZ (dyspnea on exertion) 03/13/2020     Priority: Medium     Other specified hypothyroidism 03/05/2020     Priority: Medium     Last Assessment & Plan:   Deferred conversation as patient overwhelmed, will discuss at future OV       Diarrhea 02/06/2019     Priority: Medium     Male erectile dysfunction 09/20/2017     Priority: Medium     Chronic sinusitis 07/19/2016     Priority: Medium     Ulcerative colitis with complication (H) 01/12/2016     Priority: Medium     Last Assessment & Plan:   Frequent stooling seems to be from frequent sensation of needing to defecate from bloating/gas.  For now, patient would like to continue cholestyramine as this does seem to make stools a little  more formed and rx was expensive.  Will also start simethicone to see if this helps with bloating.       Essential (primary) hypertension 01/12/2016     Priority: Medium     Last Assessment & Plan:   BP at goal of <140/90 mmHg without significant adverse effects  *Continue current regimen  *Will continue to monitor BP, renal function, and potential medication side effects at future OV       Hyperlipidemia 01/12/2016     Priority: Medium     ACC/AHA 10 year ASCVD risk score 45%, moderate intensity statin recommended based on age and 02/2020 labs      Last Assessment & Plan:   Patient concerned about taking atorvastatin or any other statin because he feels this caused his IBD.  Recommended he discuss concerns and possible alternative agents such as PCSK9 inhibitors with Cardiology given upcoming consultation for anginal chest pain.  *Pt verbalized understanding and agreement with this plan         Medications:  Current Outpatient Medications   Medication Sig Dispense Refill     aspirin 81 MG EC tablet Take 81 mg by mouth daily       clopidogrel (PLAVIX) 75 MG tablet Take 75 mg by mouth daily       diltiazem ER (DILT-XR) 240 MG 24 hr ER beaded capsule Take 240 mg by mouth daily       Ferrous Sulfate 28 MG TABS Take 28 mg by mouth daily       fish oil-omega-3 fatty acids 1000 MG capsule Take 2 g by mouth 2 times daily (with meals)       furosemide (LASIX) 40 MG tablet Take 40 mg by mouth daily       losartan (COZAAR) 25 MG tablet Take 25 mg by mouth daily       Misc Natural Products (GINSENG-COMPLEX PO) Take 1,000 mg by mouth daily       Multiple Vitamins-Minerals (LUTEIN-ZEAXANTHIN) TABS Take 1 tablet by mouth daily Patient reports each tablet contains: Lutein 40mg, Zeaxanthin 1600mcg       Multiple Vitamins-Minerals (SENIOR MULTIVITAMIN PLUS PO) Take 1 tablet by mouth daily       Red Yeast Rice 600 MG TABS Take 1,200 mg by mouth 2 times daily (with meals)       Vitamin D, Cholecalciferol, 25 MCG (1000 UT) TABS Take  "1,000 Units by mouth daily         Allergies:  Allergies   Allergen Reactions     Lisinopril Other (See Comments) and Unknown     Pt developed PONCHO from medication.   Renal failure  Renal failure        Rosuvastatin      Other reaction(s): Abdominal pain     Hmg-Coa-R Inhibitors      Atorvastatin Diarrhea       Family history:  No family history on file.    Social history:  Social History     Tobacco Use     Smoking status: Former Smoker     Quit date:      Years since quittin.9     Smokeless tobacco: Never Used   Substance Use Topics     Alcohol use: Not on file    Marital status: .      Nursing Notes:   Chirag Segal EMT  2020  4:54 PM  Signed  Chief Complaint   Patient presents with     Consult     Discuss surgery for recurrent SBO.       Vitals:    20 1653   Weight: 215 lb   Height: 5' 8\"       Body mass index is 32.69 kg/m .    DARRIUS Resendiz MD  Colon and Rectal Surgery Attending  Department of Surgery  [facility]  "

## 2020-11-25 NOTE — NURSING NOTE
"Chief Complaint   Patient presents with     Consult     Discuss surgery for recurrent SBO.       Vitals:    11/25/20 1653   Weight: 215 lb   Height: 5' 8\"       Body mass index is 32.69 kg/m .      Chirag Bsuby, EMT                        "

## 2020-11-26 PROBLEM — E03.8 OTHER SPECIFIED HYPOTHYROIDISM: Status: ACTIVE | Noted: 2020-03-05

## 2020-11-26 PROBLEM — R00.1 BRADYCARDIA: Status: ACTIVE | Noted: 2020-03-13

## 2020-11-26 PROBLEM — R06.09 DOE (DYSPNEA ON EXERTION): Status: ACTIVE | Noted: 2020-03-13

## 2020-11-26 PROBLEM — R19.7 DIARRHEA: Status: ACTIVE | Noted: 2019-02-06

## 2022-02-17 PROBLEM — I25.10 CORONARY ARTERY DISEASE INVOLVING NATIVE HEART: Status: ACTIVE | Noted: 2020-11-26

## 2022-10-04 PROBLEM — N52.9 ERECTILE DYSFUNCTION: Status: ACTIVE | Noted: 2017-09-20

## 2023-08-05 ENCOUNTER — HEALTH MAINTENANCE LETTER (OUTPATIENT)
Age: 83
End: 2023-08-05